# Patient Record
Sex: MALE | Race: WHITE | NOT HISPANIC OR LATINO
[De-identification: names, ages, dates, MRNs, and addresses within clinical notes are randomized per-mention and may not be internally consistent; named-entity substitution may affect disease eponyms.]

---

## 2024-04-23 ENCOUNTER — APPOINTMENT (OUTPATIENT)
Dept: PULMONOLOGY | Facility: CLINIC | Age: 59
End: 2024-04-23
Payer: COMMERCIAL

## 2024-04-23 VITALS
RESPIRATION RATE: 16 BRPM | OXYGEN SATURATION: 98 % | SYSTOLIC BLOOD PRESSURE: 130 MMHG | HEART RATE: 76 BPM | DIASTOLIC BLOOD PRESSURE: 82 MMHG

## 2024-04-23 VITALS — BODY MASS INDEX: 32.35 KG/M2 | HEIGHT: 70 IN | WEIGHT: 226 LBS

## 2024-04-23 DIAGNOSIS — Z95.2 PRESENCE OF PROSTHETIC HEART VALVE: ICD-10-CM

## 2024-04-23 DIAGNOSIS — E04.1 NONTOXIC SINGLE THYROID NODULE: ICD-10-CM

## 2024-04-23 DIAGNOSIS — R05.3 CHRONIC COUGH: ICD-10-CM

## 2024-04-23 DIAGNOSIS — Z87.891 PERSONAL HISTORY OF NICOTINE DEPENDENCE: ICD-10-CM

## 2024-04-23 DIAGNOSIS — R09.82 POSTNASAL DRIP: ICD-10-CM

## 2024-04-23 PROCEDURE — 99205 OFFICE O/P NEW HI 60 MIN: CPT | Mod: 25

## 2024-04-23 PROCEDURE — 94010 BREATHING CAPACITY TEST: CPT

## 2024-04-23 RX ORDER — OMEPRAZOLE 20 MG/1
20 CAPSULE, DELAYED RELEASE ORAL
Refills: 0 | Status: ACTIVE | COMMUNITY

## 2024-04-23 RX ORDER — DULOXETINE HYDROCHLORIDE 30 MG/1
CAPSULE, DELAYED RELEASE ORAL
Refills: 0 | Status: ACTIVE | COMMUNITY

## 2024-04-23 RX ORDER — METFORMIN HYDROCHLORIDE 1000 MG/1
1000 TABLET, COATED ORAL
Refills: 0 | Status: ACTIVE | COMMUNITY

## 2024-04-23 RX ORDER — GABAPENTIN 300 MG/1
300 CAPSULE ORAL
Refills: 0 | Status: ACTIVE | COMMUNITY

## 2024-04-23 RX ORDER — MULTIVITAMIN
TABLET ORAL
Refills: 0 | Status: ACTIVE | COMMUNITY

## 2024-04-23 RX ORDER — APIXABAN 5 MG/1
TABLET, FILM COATED ORAL
Refills: 0 | Status: ACTIVE | COMMUNITY

## 2024-04-23 RX ORDER — AMLODIPINE BESYLATE 10 MG/1
10 TABLET ORAL
Refills: 0 | Status: ACTIVE | COMMUNITY

## 2024-04-23 RX ORDER — RAMIPRIL 5 MG/1
5 CAPSULE ORAL
Refills: 0 | Status: ACTIVE | COMMUNITY

## 2024-04-23 RX ORDER — EMPAGLIFLOZIN 10 MG/1
10 TABLET, FILM COATED ORAL
Refills: 0 | Status: ACTIVE | COMMUNITY

## 2024-04-23 RX ORDER — ATORVASTATIN CALCIUM 40 MG/1
40 TABLET, FILM COATED ORAL
Refills: 0 | Status: ACTIVE | COMMUNITY

## 2024-04-23 RX ORDER — GLIMEPIRIDE 4 MG/1
TABLET ORAL
Refills: 0 | Status: ACTIVE | COMMUNITY

## 2024-04-23 RX ORDER — SITAGLIPTIN 100 MG/1
100 TABLET, FILM COATED ORAL
Refills: 0 | Status: ACTIVE | COMMUNITY

## 2024-04-23 RX ORDER — ASPIRIN 81 MG
81 TABLET, DELAYED RELEASE (ENTERIC COATED) ORAL
Refills: 0 | Status: ACTIVE | COMMUNITY

## 2024-04-23 NOTE — CONSULT LETTER
[Dear  ___] : Dear  [unfilled], [Consult Letter:] : I had the pleasure of evaluating your patient, [unfilled]. [Please see my note below.] : Please see my note below. [Sincerely,] : Sincerely, [FreeTextEntry3] : Elder [facility]u [facility] Director Pul[facility] Division Medical Director Respiratory Therapy [facility]

## 2024-04-23 NOTE — DISCUSSION/SUMMARY
[FreeTextEntry1] : Chronic cough, Post nasal drip appears rate limiting Spirometry is normal, lungs are without bronchospasm "Frequent throat clearing" appears to be predominant symptom CT coronary by report [facility] 12/23 , no pulmonary pathology Needs updated CXR Thyroid sonogram, allergy panel Discussed trial of non sedating anti histamine, no D, and Flonase ENT eval Hx snoring, does not want sleep study, working on wt loss 4 months or sooner if needed, will call with above

## 2024-04-23 NOTE — PHYSICAL EXAM
[No Acute Distress] : no acute distress [Normal Appearance] : normal appearance [Normal Rate/Rhythm] : normal rate/rhythm [Normal S1, S2] : normal s1, s2 [No Murmurs] : no murmurs [No Rubs] : no rubs [No Gallops] : no gallops [No Resp Distress] : no resp distress [No Acc Muscle Use] : no acc muscle use [Normal Rhythm and Effort] : normal rhythm and effort [Clear to Auscultation Bilaterally] : clear to auscultation bilaterally [Normal to Percussion] : normal to percussion [No Abnormalities] : no abnormalities [Normal Gait] : normal gait [No Clubbing] : no clubbing [No Edema] : no edema [FROM] : FROM [Normal Color/ Pigmentation] : normal color/ pigmentation [No Focal Deficits] : no focal deficits [Oriented x3] : oriented x3 [Normal Affect] : normal affect

## 2024-04-23 NOTE — CONSULT LETTER
[Dear  ___] : Dear  [unfilled], [Consult Letter:] : I had the pleasure of evaluating your patient, [unfilled]. [Please see my note below.] : Please see my note below. [Sincerely,] : Sincerely, [FreeTextEntry3] : Elder [facility] Director Pul[facility] Division Medical Director Respiratory Therapy [facility]

## 2024-04-23 NOTE — DISCUSSION/SUMMARY
[FreeTextEntry1] : Chronic cough, Post nasal drip appears rate limiting Spirometry is normal, lungs are without bronchospasm "Frequent throat clearing" appears to be predominant symptom CT coronary by report [facility] 12/23 , no pulmonary pathology Needs updated CXR Thyroid sonogram, allergy panel Discussed trial of non sedating anti histamine, no D, and Flonase ENT eval Hx snoring, does not want sleep study, working on wt loss 4 months or sooner if needed, will call with above

## 2024-04-25 NOTE — HISTORY OF PRESENT ILLNESS
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

## 2024-07-26 ENCOUNTER — APPOINTMENT (OUTPATIENT)
Dept: PULMONOLOGY | Facility: CLINIC | Age: 59
End: 2024-07-26

## 2025-05-27 NOTE — HISTORY OF PRESENT ILLNESS
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